# Patient Record
Sex: FEMALE | Race: WHITE
[De-identification: names, ages, dates, MRNs, and addresses within clinical notes are randomized per-mention and may not be internally consistent; named-entity substitution may affect disease eponyms.]

---

## 2017-04-06 ENCOUNTER — TRANSCRIPTION ENCOUNTER (OUTPATIENT)
Age: 22
End: 2017-04-06

## 2017-05-05 ENCOUNTER — TRANSCRIPTION ENCOUNTER (OUTPATIENT)
Age: 22
End: 2017-05-05

## 2017-07-25 ENCOUNTER — TRANSCRIPTION ENCOUNTER (OUTPATIENT)
Age: 22
End: 2017-07-25

## 2017-09-04 ENCOUNTER — EMERGENCY (EMERGENCY)
Facility: HOSPITAL | Age: 22
LOS: 1 days | Discharge: HOME | End: 2017-09-04
Attending: EMERGENCY MEDICINE | Admitting: EMERGENCY MEDICINE
Payer: COMMERCIAL

## 2017-09-04 VITALS
OXYGEN SATURATION: 100 % | DIASTOLIC BLOOD PRESSURE: 68 MMHG | RESPIRATION RATE: 17 BRPM | SYSTOLIC BLOOD PRESSURE: 100 MMHG | HEART RATE: 78 BPM | TEMPERATURE: 98 F

## 2017-09-04 LAB
APPEARANCE UR: CLEAR — SIGNIFICANT CHANGE UP
BACTERIA # UR AUTO: ABNORMAL /HPF
BILIRUB UR-MCNC: NEGATIVE — SIGNIFICANT CHANGE UP
COLOR SPEC: COLORLESS — SIGNIFICANT CHANGE UP
DIFF PNL FLD: ABNORMAL
EPI CELLS # UR: SIGNIFICANT CHANGE UP /HPF
GLUCOSE UR QL: NEGATIVE — SIGNIFICANT CHANGE UP
KETONES UR-MCNC: NEGATIVE — SIGNIFICANT CHANGE UP
LEUKOCYTE ESTERASE UR-ACNC: NEGATIVE — SIGNIFICANT CHANGE UP
NITRITE UR-MCNC: NEGATIVE — SIGNIFICANT CHANGE UP
PH UR: 7 — SIGNIFICANT CHANGE UP (ref 5–8)
PROT UR-MCNC: NEGATIVE — SIGNIFICANT CHANGE UP
RBC CASTS # UR COMP ASSIST: SIGNIFICANT CHANGE UP /HPF (ref 0–2)
SP GR SPEC: 1.01 — SIGNIFICANT CHANGE UP (ref 1.01–1.02)
UROBILINOGEN FLD QL: NEGATIVE — SIGNIFICANT CHANGE UP
WBC UR QL: SIGNIFICANT CHANGE UP /HPF (ref 0–5)

## 2017-09-04 PROCEDURE — 81001 URINALYSIS AUTO W/SCOPE: CPT

## 2017-09-04 PROCEDURE — 99284 EMERGENCY DEPT VISIT MOD MDM: CPT

## 2017-09-04 PROCEDURE — 81025 URINE PREGNANCY TEST: CPT

## 2017-09-04 PROCEDURE — 87086 URINE CULTURE/COLONY COUNT: CPT

## 2017-09-04 RX ORDER — FLUCONAZOLE 150 MG/1
150 TABLET ORAL ONCE
Qty: 0 | Refills: 0 | Status: COMPLETED | OUTPATIENT
Start: 2017-09-04 | End: 2017-09-04

## 2017-09-04 RX ADMIN — FLUCONAZOLE 150 MILLIGRAM(S): 150 TABLET ORAL at 19:42

## 2017-09-04 NOTE — ED PROVIDER NOTE - PLAN OF CARE
Follow up with your Primary Care Physician within the next 2-3 days  Bring a copy of your test results with you to your appointment  Continue your current medication regimen  Return to the Emergency Room if you experience new or worsening symptoms  stay hydrated  You were given diflucan for yeast infection  Take antibiotics as prescribed Follow up with your Primary Care Physician within the next 2-3 days  Bring a copy of your test results with you to your appointment  Continue your current medication regimen  Return to the Emergency Room if you experience new or worsening symptoms  stay hydrated  You were given diflucan for yeast infection  Follow up with your gynecologist ATTENDING ADDENDUM (Dr. Mustapha Youssef): Goals of care include resolution of emergent/urgent symptoms and concerns, and restoration to baseline level of homeostasis.

## 2017-09-04 NOTE — ED PROVIDER NOTE - GENITOURINARY [+], MLM
dysuria dysuria **ATTENDING ADDENDUM (Dr. Mustapha Youssef): POSITIVE mild suprapubic discomfort (NO severe abdominal pain)

## 2017-09-04 NOTE — ED PROVIDER NOTE - CARE PLAN
Principal Discharge DX:	Dysuria Principal Discharge DX:	Dysuria  Instructions for follow-up, activity and diet:	Follow up with your Primary Care Physician within the next 2-3 days  Bring a copy of your test results with you to your appointment  Continue your current medication regimen  Return to the Emergency Room if you experience new or worsening symptoms  stay hydrated  You were given diflucan for yeast infection  Take antibiotics as prescribed Principal Discharge DX:	Dysuria  Instructions for follow-up, activity and diet:	Follow up with your Primary Care Physician within the next 2-3 days  Bring a copy of your test results with you to your appointment  Continue your current medication regimen  Return to the Emergency Room if you experience new or worsening symptoms  stay hydrated  You were given diflucan for yeast infection  Follow up with your gynecologist Principal Discharge DX:	Dysuria  Goal:	ATTENDING ADDENDUM (Dr. Mustapha Youssef): Goals of care include resolution of emergent/urgent symptoms and concerns, and restoration to baseline level of homeostasis.  Instructions for follow-up, activity and diet:	Follow up with your Primary Care Physician within the next 2-3 days  Bring a copy of your test results with you to your appointment  Continue your current medication regimen  Return to the Emergency Room if you experience new or worsening symptoms  stay hydrated  You were given diflucan for yeast infection  Follow up with your gynecologist  Secondary Diagnosis:	Subacute vaginitis

## 2017-09-04 NOTE — ED PROVIDER NOTE - MEDICAL DECISION MAKING DETAILS
**ATTENDING MEDICAL DECISION MAKING/SYNTHESIS (Dr. Mustapha Youssef): I have reviewed the Chief Complaint, the HPI, the ROS, and have directly performed and confirmed the findings on the Physical Examination. I have reviewed the medical decision making with all providers, as applicable. The PROBLEM REPRESENTATION at this time is: 22-year-old woman with history of frequent urinary tract infections now presenting with suprapubic discomfort (with extension to introitus/vaginal vault) and dysuria over the past week. POSITIVE frequency, urgency, hesitancy, and dysuria. POSITIVE vaginal discharge also reported. LMP reported two weeks ago. The MOST LIKELY DIAGNOSIS, and the LIST OF DIFFERENTIAL DIAGNOSES, includes (but is not limited to) the following: urinary tract infection (simple v. recurrent v. complicated; possible), serious bacterial infection or sepsis/severe sepsis e.g. pyelonephritis (less likely), candidal vaginitis (possible), other vaginitis e.g. BV, trichomonas or equivalent (possible), other cause of acute lower pelvic pain e.g. tubu-ovarian abscess, acute appendicitis, diverticulitis/colitis, ovarian cyst/torsion, sexually transmitted infection (PID), perforation, peritonitis (NO evidence of these conditions), renal colic (unlikely, NO evidence). The likelihood of each of these diagnoses has been appropriately considered in the context of this patient's presentation and my evaluation. PLAN: as described in EMR, including diagnostics, therapeutics and consultation as clinically warranted. I will continue to reevaluate the patient, including the results of all testing, and monitor response to therapy throughout the patient's course in the ED.

## 2017-09-04 NOTE — ED PROVIDER NOTE - CONDUCTED A DETAILED DISCUSSION WITH PATIENT AND/OR GUARDIAN REGARDING, MDM
need for outpatient follow-up/**ATTENDING ADDENDUM (Dr. Mustapha Youssef): Anticipatory guidance provided./lab results/return to ED if symptoms worsen, persist or questions arise

## 2017-09-04 NOTE — ED PROVIDER NOTE - OBJECTIVE STATEMENT
23 y/o female with frequent UTIs coming in for suprapubic discomfort and dysuria for 1 week. denies back pain, vomiting, fever, hematuria. lmp 2 weeks ago. + vag discharge, white. no concern for STD.  states this feels typical of her UTIs 21 y/o female with frequent UTIs coming in for suprapubic discomfort and dysuria for 1 week. denies back pain, vomiting, fever, hematuria. lmp 2 weeks ago. + vag discharge, white. no concern for STD.  states this feels typical of her UTIs.  **ATTENDING ADDENDUM (Dr. Mustapha Youssef): I attest that I have directly examined this patient and elicited a comparable history of present illness and review of systems with my collaborating provider (NP/PA). I attest that I have made significant contributions to the documentation where necessary and as noted in the EMR. Of note, and in addition to the above, patient is a 22-year-old woman with history of frequent urinary tract infections now presenting with suprapubic discomfort and dysuria over the past week. 21 y/o female with frequent UTIs coming in for suprapubic discomfort and dysuria for 1 week. denies back pain, vomiting, fever, hematuria. lmp 2 weeks ago. + vag discharge, white. no concern for STD.  states this feels typical of her UTIs.  **ATTENDING ADDENDUM (Dr. Mustapha Youssef): I attest that I have directly examined this patient and elicited a comparable history of present illness and review of systems with my collaborating provider (NP/PA). I attest that I have made significant contributions to the documentation where necessary and as noted in the EMR. Of note, and in addition to the above, patient is a 22-year-old woman with history of frequent urinary tract infections now presenting with suprapubic discomfort (with extension to introitus/vaginal vault) and dysuria over the past week. POSITIVE frequency, urgency, hesitancy, and dysuria. POSITIVE vaginal discharge also reported. LMP reported two weeks ago. NO fevers, chills, nausea, vomiting, diarrhea, constipation, severe abdominal pain, flank/back pain or hematuria. Feels somewhat similar to prior urinary tract infections. NO rashes, lesions, blisters, vesicles, cysts, abscesses, or erythema.  POSITIVE sexually active. Here for evaluation. VAS 2/10.

## 2017-09-04 NOTE — ED PROVIDER NOTE - GASTROINTESTINAL, MLM
Abdomen soft, non-tender, no guarding. Abdomen soft **ATTENDING ADDENDUM (Dr. Mustapha Youssef): POSITIVE minimal tenderness in the suprapubic area. NO guarding, rebound, or rigidity. NO pulsatile or non-pulsatile masses. NO hernias. NO obvious hepatosplenomegaly.

## 2017-09-04 NOTE — ED PROVIDER NOTE - MUSCULOSKELETAL, MLM
Spine appears normal, range of motion is not limited, no muscle or joint tenderness Spine appears normal, range of motion is not limited, no muscle or joint tenderness **ATTENDING ADDENDUM (Dr. Mustapha Youssef): NO costovertebral angle tenderness.

## 2017-09-04 NOTE — ED PROVIDER NOTE - EYES, MLM
Clear bilaterally, pupils equal and round. **ATTENDING ADDENDUM (Dr. Mustapha Youssef): Extraocular muscle movements intact.

## 2017-09-04 NOTE — ED PROVIDER NOTE - PHYSICAL EXAMINATION
**ATTENDING ADDENDUM (Dr. Mustapha Youssef): I have reviewed and substantially contributed to the elements of the PE as documented above. I have directly performed an examination of this patient in conjunction with the other members (EM resident/PA/NP) of the patient care team.

## 2017-09-04 NOTE — ED PROVIDER NOTE - ATTENDING CONTRIBUTION TO CARE
**ATTENDING ADDENDUM (Dr. Mustapha Youssef): I attest that I have directly examined this patient and reviewed and formulated the diagnostic and therapeutic management plan in collaboration with the advanced practitioner (NP, PA). Please see MDM note and remainder of EMR for findings from CC, HPI, ROS, and PE. (Russ)

## 2017-09-05 LAB
C TRACH RRNA SPEC QL NAA+PROBE: SIGNIFICANT CHANGE UP
CULTURE RESULTS: NO GROWTH — SIGNIFICANT CHANGE UP
N GONORRHOEA RRNA SPEC QL NAA+PROBE: SIGNIFICANT CHANGE UP
SPECIMEN SOURCE: SIGNIFICANT CHANGE UP
SPECIMEN SOURCE: SIGNIFICANT CHANGE UP

## 2018-02-26 ENCOUNTER — TRANSCRIPTION ENCOUNTER (OUTPATIENT)
Age: 23
End: 2018-02-26

## 2018-08-27 ENCOUNTER — TRANSCRIPTION ENCOUNTER (OUTPATIENT)
Age: 23
End: 2018-08-27